# Patient Record
Sex: FEMALE | Race: BLACK OR AFRICAN AMERICAN | NOT HISPANIC OR LATINO
[De-identification: names, ages, dates, MRNs, and addresses within clinical notes are randomized per-mention and may not be internally consistent; named-entity substitution may affect disease eponyms.]

---

## 2018-09-20 ENCOUNTER — RX RENEWAL (OUTPATIENT)
Age: 48
End: 2018-09-20

## 2018-10-03 ENCOUNTER — APPOINTMENT (OUTPATIENT)
Dept: VASCULAR SURGERY | Facility: CLINIC | Age: 48
End: 2018-10-03
Payer: COMMERCIAL

## 2018-10-03 VITALS
TEMPERATURE: 99.2 F | SYSTOLIC BLOOD PRESSURE: 103 MMHG | HEART RATE: 78 BPM | WEIGHT: 155 LBS | DIASTOLIC BLOOD PRESSURE: 64 MMHG | HEIGHT: 66 IN | BODY MASS INDEX: 24.91 KG/M2

## 2018-10-03 PROCEDURE — 99202 OFFICE O/P NEW SF 15 MIN: CPT

## 2019-04-03 ENCOUNTER — APPOINTMENT (OUTPATIENT)
Dept: OBGYN | Facility: CLINIC | Age: 49
End: 2019-04-03
Payer: COMMERCIAL

## 2019-04-03 VITALS
HEART RATE: 77 BPM | BODY MASS INDEX: 26.03 KG/M2 | HEIGHT: 66 IN | WEIGHT: 162 LBS | SYSTOLIC BLOOD PRESSURE: 125 MMHG | DIASTOLIC BLOOD PRESSURE: 74 MMHG

## 2019-04-03 DIAGNOSIS — Z01.419 ENCOUNTER FOR GYNECOLOGICAL EXAMINATION (GENERAL) (ROUTINE) W/OUT ABNORMAL FINDINGS: ICD-10-CM

## 2019-04-03 PROCEDURE — 99396 PREV VISIT EST AGE 40-64: CPT

## 2019-04-17 NOTE — COUNSELING
[Breast Self Exam] : breast self exam [Nutrition] : nutrition [Exercise] : exercise [Vitamins/Supplements] : vitamins/supplements [Pregnancy Options] : pregnancy options

## 2019-04-17 NOTE — PHYSICAL EXAM
[Alert] : alert [Acute Distress] : no acute distress [Awake] : awake [Mass] : no breast mass [Thyroid Nodule] : no thyroid nodule [LAD] : no lymphadenopathy [Nipple Discharge] : no nipple discharge [Tender] : non tender [Distended] : not distended [Soft] : soft [Axillary LAD] : no axillary lymphadenopathy [Oriented x3] : oriented to person, place, and time [Normal] : vagina [Uterine Adnexae] : were not tender and not enlarged [No Bleeding] : there was no active vaginal bleeding

## 2019-04-17 NOTE — HISTORY OF PRESENT ILLNESS
[Last Pap ___] : Last cervical pap smear was [unfilled] [Last Mammogram ___] : Last Mammogram was [unfilled] [Good] : being in good health [Sexually Active] : is sexually active [Regular Cycle Intervals] : periods have been regular [Contraception] : does not use contraception

## 2019-05-09 LAB
CYTOLOGY CVX/VAG DOC THIN PREP: NORMAL
HPV HIGH+LOW RISK DNA PNL CVX: NOT DETECTED

## 2020-07-29 ENCOUNTER — APPOINTMENT (OUTPATIENT)
Dept: VASCULAR SURGERY | Facility: CLINIC | Age: 50
End: 2020-07-29
Payer: COMMERCIAL

## 2020-07-29 VITALS
HEIGHT: 66 IN | BODY MASS INDEX: 28.12 KG/M2 | TEMPERATURE: 98 F | SYSTOLIC BLOOD PRESSURE: 100 MMHG | WEIGHT: 175 LBS | DIASTOLIC BLOOD PRESSURE: 65 MMHG | HEART RATE: 77 BPM

## 2020-07-29 DIAGNOSIS — I83.93 ASYMPTOMATIC VARICOSE VEINS OF BILATERAL LOWER EXTREMITIES: ICD-10-CM

## 2020-07-29 PROCEDURE — 99212 OFFICE O/P EST SF 10 MIN: CPT

## 2020-08-13 DIAGNOSIS — Z01.818 ENCOUNTER FOR OTHER PREPROCEDURAL EXAMINATION: ICD-10-CM

## 2020-08-15 ENCOUNTER — APPOINTMENT (OUTPATIENT)
Dept: DISASTER EMERGENCY | Facility: CLINIC | Age: 50
End: 2020-08-15

## 2020-08-16 LAB — SARS-COV-2 N GENE NPH QL NAA+PROBE: NOT DETECTED

## 2020-08-20 ENCOUNTER — APPOINTMENT (OUTPATIENT)
Dept: VASCULAR SURGERY | Facility: CLINIC | Age: 50
End: 2020-08-20
Payer: SELF-PAY

## 2020-08-20 PROCEDURE — 36468 NJX SCLRSNT SPIDER VEINS: CPT

## 2020-08-20 NOTE — PROCEDURE
[FreeTextEntry1] : bilateral sclerotherapy [FreeTextEntry3] : Procedural safety checklist and time out completed:\par Confirmed patient identification (Patient Name, , and/or medical record number including when possible affirmation by patient or parent/family/other).\par Confirmed procedure with the patient. Consent present, accurate and signed. \par Confirmed special equipment and supplies are present.\par Sterility confirmed. Position verified. \par Site/ side is marked and visible and confirmed. \par Procedure confirmed by consent. Accurate consent including side and site.\par Review of medical records, including venous ultrasound, noting correct procedure including site and side.\par MD/PA verifies presence and review of imaging studies and or written report of imaging studies.\par Agreement on the procedure to be performed\par Time out completed.\par All of the above has been confirmed by the team.\par All patient-specific concerns have been addressed. \par \par Indication:  Bilateral lower extremity spider veins.\par \par Procedure: Bilateral lower extremity sclerotherapy. \par \par Procedure Note:  Ms. HUNTER MARRUFO is a 50 year old F with bilateral  lower extremity spider and branch veins. \par \par I have discussed the risks of the procedure with the patient. Detailed discussion was held with the patient. Risks of itching, superficial thrombophlebitis, hyperpigmentation (darkening of the skin), allergic reactions, skin irritation due to extravasation of the sclerosant, air-embolism, and in rare cases deep vein thrombosis were all discussed with the patient.  Reviewed with patient that sclerotherapy is the injection of a sterile agent (polidocanol) into the spider and small branch varicose veins. It works by damaging the inner wall of the vein. Eventually, the vein collapses on itself and over time, the damaged vein is replaced with fibrous connective tissue and prevents blood flow through the vessel. Sclerotherapy does not prevent the development of new veins. Before the treatment, photos may be obtained. Pre-procedure COVID PCR was negative.\par The patient agrees to the procedure. The patient was escorted into the procedure room, placed on the procedure table and a time out was called. The legs for sclerotherapy treatment were cleaned with 70% isopropyl alcohol. \par \par Sclerosant used was 1.0 % polidocanol (Asclera). Each session consists of a total dose of  4 mL of 1.0 % Asclera (polidocanol) which is directly injected using a 30 gauge needle into the superficial spider veins and small branch veins. \par \par 1st session\par The following areas were injected bilateral lateral, medial and posterior legs. \par 1.0 % Asclera  lot# [id number], expiration  10/2021\par \par Dry gauze was applied to the treated areas of the leg and a compression bandage was applied. Patient instructed to wear the bandage for 72 hours and then wear 20-30mmHg compression stockings daily for minimum of 2 weeks. Patient may remove compression bandage after 24 hrs, shower and don compression stockings for continuous compression x 72 hrs. Patient tolerated the procedure well. A follow-up appt is scheduled for the patient in 4-6 weeks and instructions provided. \par  \par \par \par

## 2020-09-10 ENCOUNTER — APPOINTMENT (OUTPATIENT)
Dept: VASCULAR SURGERY | Facility: CLINIC | Age: 50
End: 2020-09-10
Payer: COMMERCIAL

## 2020-09-10 PROCEDURE — 99212 OFFICE O/P EST SF 10 MIN: CPT

## 2020-09-10 NOTE — ASSESSMENT
[FreeTextEntry1] : Ms. HUNTRE MARRUFO is a 50 year who presents to the office for follow-up evaluation of her spider and branch veins. She is s/p bilateral sclerotherapy on 8/20/2020. Ms. MARRUFO is doing well. She  denies skin reaction to the sclerosant, such as, burning, redness, skin breakdown. Patient reports a decrease in the amount of spider veins visualized. Patient reports the veins appeared to initially have a inflamed red scratchy appearance which has since resolved and notes reddish discoloration of the previous "purplish" veins. There is no hyperpigmentation.  Ms. MARRUFO is a  and has been compliant with compression stocking use. Patient denies fever or chills after the injections. Overall the patient is pleased with the result. There were 2 areas that were injected that may benefit from a re-injection. \par \par Spider veins a/p sclerotherapy. Doing well. No complaints. Follow-up in 2 weeks.

## 2020-09-10 NOTE — HISTORY OF PRESENT ILLNESS
[FreeTextEntry1] : Ms. HUNTER MARRUFO is a 50 year who presents to the office for follow-up evaluation of her spider and branch veins. She is s/p bilateral sclerotherapy on 8/20/2020. Ms. MARRUFO is doing well. She  denies skin reaction to the sclerosant, such as, burning, redness, skin breakdown. Patient reports a decrease in the amount of spider veins visualized. Patient reports the veins appeared to initially have a inflamed red scratchy appearance which has since resolved and notes reddish discoloration of the previous "purplish" veins. There is no hyperpigmentation.  Ms. MARRUFO is a  and has been compliant with compression stocking use. Patient denies fever or chills after the injections. Overall the patient is pleased with the result. There were 2 areas that were injected that may benefit from a re-injection. \par \par

## 2020-09-10 NOTE — PROCEDURE
[FreeTextEntry1] : Procedural safety checklist and time out completed:\par Confirmed patient identification (Patient Name, , and/or medical record number including when possible affirmation by patient or parent/family/other).\par Confirmed procedure with the patient. Consent present, accurate and signed. \par Confirmed special equipment and supplies are present.\par Sterility confirmed. Position verified. \par Site/ side is marked and visible and confirmed. \par Procedure confirmed by consent. Accurate consent including side and site.\par Review of medical records, including venous ultrasound, noting correct procedure including site and side.\par MD/PA verifies presence and review of imaging studies and or written report of imaging studies.\par Agreement on the procedure to be performed\par Time out completed.\par All of the above has been confirmed by the team.\par All patient-specific concerns have been addressed. \par \par Indication: Left  lower extremity spider veins.\par \par Procedure: Left lower extremity sclerotherapy touchup to 2 areas previously injected by Dr Alston and myself.\par Dr Alston and myself have discussed the risks of the procedure with the patient. Detailed discussion was held with the patient. Risks of itching, superficial thrombophlebitis, hyperpigmentation (darkening of the skin), allergic reactions, skin irritation due to extravasation of the sclerosant, air-embolism, and in rare cases deep vein thrombosis were all discussed with the patient.  Reviewed with patient that sclerotherapy is the injection of a sterile agent (polidocanol) into the spider and small branch varicose veins. It works by damaging the inner wall of the vein. Eventually, the vein collapses on itself and over time, the damaged vein is replaced with fibrous connective tissue and prevents blood flow through the vessel. Sclerotherapy does not prevent the development of new veins. Before the treatment, photos may be obtained. Pre-procedure COVID PCR was negative.\par The patient agrees to the procedure. The patient was escorted into the procedure room, placed on the procedure table and a time out was called. The left left for sclerotherapy treatment was cleaned with 70% isopropyl alcohol. \par \par Sclerosant used was 1.0 % polidocanol (Asclera). Each session consists of a total dose of  1 mL of 1.0 % Asclera (polidocanol) which is directly injected using a 30 gauge needle into the superficial spider veins and small branch veins. \par \par 2nd session - touch-up left lateral thigh and left posterior medial distal thigh, knee and prox calf. \par 1.0 % Asclera  lot# [id number], expiration  10/21\par \par Dry gauze was applied to the treated areas of the leg and a compression bandage was applied. Patient instructed to wear the bandage for 72 hours and then wear 20-30mmHg compression stockings daily for minimum of 2 weeks. Patient may remove compression bandage after 24 hrs, shower and don compression stockings for continuous compression x 72 hrs. Patient tolerated the procedure well. A follow-up appt is scheduled for the patient in 4-6 weeks and instructions provided. \par  \par \par \par

## 2020-09-10 NOTE — REVIEW OF SYSTEMS
[Fever] : no fever [Chills] : no chills [Chest Pain] : no chest pain [Leg Claudication] : no intermittent leg claudication [Lower Ext Edema] : no lower extremity edema [Shortness Of Breath] : no shortness of breath [Limb Weakness] : no limb weakness [Easy Bruising] : no tendency for easy bruising [Easy Bleeding] : no tendency for easy bleeding

## 2020-09-10 NOTE — PHYSICAL EXAM
[2+] : left 2+ [Alert] : alert [Oriented to Place] : oriented to place [Oriented to Person] : oriented to person [Oriented to Time] : oriented to time [Calm] : calm [Ankle Swelling (On Exam)] : not present [] : not present [Skin Induration] : no induration [Skin Ulcer] : no ulcer [de-identified] : Fading of the injected spider and branch veins noted. No thrombosed veins noted. No hyperpigmentation. No matting. No skin breakdown or ulcers. \par \par  [de-identified] : well appearing pleasant and well put together female in NAD [de-identified] : pleasant and cooperative

## 2021-04-17 ENCOUNTER — RESULT REVIEW (OUTPATIENT)
Age: 51
End: 2021-04-17

## 2021-06-29 ENCOUNTER — APPOINTMENT (OUTPATIENT)
Dept: OBGYN | Facility: CLINIC | Age: 51
End: 2021-06-29

## 2021-07-07 ENCOUNTER — APPOINTMENT (OUTPATIENT)
Dept: VASCULAR SURGERY | Facility: CLINIC | Age: 51
End: 2021-07-07
Payer: COMMERCIAL

## 2021-07-07 VITALS
WEIGHT: 164 LBS | BODY MASS INDEX: 26.36 KG/M2 | SYSTOLIC BLOOD PRESSURE: 106 MMHG | HEIGHT: 66 IN | HEART RATE: 75 BPM | TEMPERATURE: 94.5 F | DIASTOLIC BLOOD PRESSURE: 74 MMHG

## 2021-07-07 DIAGNOSIS — I78.1 NEVUS, NON-NEOPLASTIC: ICD-10-CM

## 2021-07-07 PROCEDURE — 99212 OFFICE O/P EST SF 10 MIN: CPT

## 2022-06-29 ENCOUNTER — RESULT REVIEW (OUTPATIENT)
Age: 52
End: 2022-06-29

## 2024-10-03 ENCOUNTER — APPOINTMENT (OUTPATIENT)
Dept: VASCULAR SURGERY | Facility: CLINIC | Age: 54
End: 2024-10-03